# Patient Record
Sex: MALE | Race: WHITE | Employment: FULL TIME | URBAN - METROPOLITAN AREA
[De-identification: names, ages, dates, MRNs, and addresses within clinical notes are randomized per-mention and may not be internally consistent; named-entity substitution may affect disease eponyms.]

---

## 2020-01-29 ENCOUNTER — HOSPITAL ENCOUNTER (OUTPATIENT)
Age: 50
Setting detail: OUTPATIENT SURGERY
Discharge: HOME | End: 2020-01-29
Attending: SPECIALIST | Admitting: SPECIALIST
Payer: OTHER GOVERNMENT

## 2020-01-29 ENCOUNTER — ANESTHESIA (OUTPATIENT)
Dept: ENDOSCOPY | Age: 50
End: 2020-01-29
Payer: OTHER GOVERNMENT

## 2020-01-29 ENCOUNTER — ANESTHESIA EVENT (OUTPATIENT)
Dept: ENDOSCOPY | Age: 50
End: 2020-01-29
Payer: OTHER GOVERNMENT

## 2020-01-29 VITALS
WEIGHT: 196 LBS | HEART RATE: 56 BPM | SYSTOLIC BLOOD PRESSURE: 119 MMHG | RESPIRATION RATE: 16 BRPM | DIASTOLIC BLOOD PRESSURE: 86 MMHG | OXYGEN SATURATION: 95 % | TEMPERATURE: 98.3 F | HEIGHT: 69 IN | BODY MASS INDEX: 29.03 KG/M2

## 2020-01-29 PROCEDURE — 74011250637 HC RX REV CODE- 250/637: Performed by: SPECIALIST

## 2020-01-29 PROCEDURE — 74011000250 HC RX REV CODE- 250: Performed by: NURSE ANESTHETIST, CERTIFIED REGISTERED

## 2020-01-29 PROCEDURE — 74011250636 HC RX REV CODE- 250/636: Performed by: NURSE ANESTHETIST, CERTIFIED REGISTERED

## 2020-01-29 PROCEDURE — 88305 TISSUE EXAM BY PATHOLOGIST: CPT

## 2020-01-29 PROCEDURE — 77030021593 HC FCPS BIOP ENDOSC BSC -A: Performed by: SPECIALIST

## 2020-01-29 PROCEDURE — 76060000031 HC ANESTHESIA FIRST 0.5 HR: Performed by: SPECIALIST

## 2020-01-29 PROCEDURE — 76040000019: Performed by: SPECIALIST

## 2020-01-29 RX ORDER — ATROPINE SULFATE 0.1 MG/ML
0.5 INJECTION INTRAVENOUS
Status: DISCONTINUED | OUTPATIENT
Start: 2020-01-29 | End: 2020-01-29 | Stop reason: HOSPADM

## 2020-01-29 RX ORDER — NALOXONE HYDROCHLORIDE 0.4 MG/ML
0.4 INJECTION, SOLUTION INTRAMUSCULAR; INTRAVENOUS; SUBCUTANEOUS
Status: DISCONTINUED | OUTPATIENT
Start: 2020-01-29 | End: 2020-01-29 | Stop reason: HOSPADM

## 2020-01-29 RX ORDER — LIDOCAINE HYDROCHLORIDE 20 MG/ML
INJECTION, SOLUTION EPIDURAL; INFILTRATION; INTRACAUDAL; PERINEURAL AS NEEDED
Status: DISCONTINUED | OUTPATIENT
Start: 2020-01-29 | End: 2020-01-29 | Stop reason: HOSPADM

## 2020-01-29 RX ORDER — FLUMAZENIL 0.1 MG/ML
0.2 INJECTION INTRAVENOUS
Status: DISCONTINUED | OUTPATIENT
Start: 2020-01-29 | End: 2020-01-29 | Stop reason: HOSPADM

## 2020-01-29 RX ORDER — SODIUM CHLORIDE 9 MG/ML
INJECTION, SOLUTION INTRAVENOUS
Status: DISCONTINUED | OUTPATIENT
Start: 2020-01-29 | End: 2020-01-29 | Stop reason: HOSPADM

## 2020-01-29 RX ORDER — SODIUM CHLORIDE 9 MG/ML
50 INJECTION, SOLUTION INTRAVENOUS CONTINUOUS
Status: DISCONTINUED | OUTPATIENT
Start: 2020-01-29 | End: 2020-01-29 | Stop reason: HOSPADM

## 2020-01-29 RX ORDER — SODIUM CHLORIDE 0.9 % (FLUSH) 0.9 %
5-40 SYRINGE (ML) INJECTION AS NEEDED
Status: DISCONTINUED | OUTPATIENT
Start: 2020-01-29 | End: 2020-01-29 | Stop reason: HOSPADM

## 2020-01-29 RX ORDER — SODIUM CHLORIDE 0.9 % (FLUSH) 0.9 %
5-40 SYRINGE (ML) INJECTION EVERY 8 HOURS
Status: DISCONTINUED | OUTPATIENT
Start: 2020-01-29 | End: 2020-01-29 | Stop reason: HOSPADM

## 2020-01-29 RX ORDER — DEXTROMETHORPHAN/PSEUDOEPHED 2.5-7.5/.8
1.2 DROPS ORAL
Status: DISCONTINUED | OUTPATIENT
Start: 2020-01-29 | End: 2020-01-29 | Stop reason: HOSPADM

## 2020-01-29 RX ORDER — EPINEPHRINE 0.1 MG/ML
1 INJECTION INTRACARDIAC; INTRAVENOUS
Status: DISCONTINUED | OUTPATIENT
Start: 2020-01-29 | End: 2020-01-29 | Stop reason: HOSPADM

## 2020-01-29 RX ORDER — PROPOFOL 10 MG/ML
INJECTION, EMULSION INTRAVENOUS AS NEEDED
Status: DISCONTINUED | OUTPATIENT
Start: 2020-01-29 | End: 2020-01-29 | Stop reason: HOSPADM

## 2020-01-29 RX ORDER — FENTANYL CITRATE 50 UG/ML
12.5-5 INJECTION, SOLUTION INTRAMUSCULAR; INTRAVENOUS
Status: DISCONTINUED | OUTPATIENT
Start: 2020-01-29 | End: 2020-01-29 | Stop reason: HOSPADM

## 2020-01-29 RX ORDER — MIDAZOLAM HYDROCHLORIDE 1 MG/ML
.25-5 INJECTION, SOLUTION INTRAMUSCULAR; INTRAVENOUS
Status: DISCONTINUED | OUTPATIENT
Start: 2020-01-29 | End: 2020-01-29 | Stop reason: HOSPADM

## 2020-01-29 RX ADMIN — PROPOFOL 30 MG: 10 INJECTION, EMULSION INTRAVENOUS at 09:48

## 2020-01-29 RX ADMIN — PROPOFOL 50 MG: 10 INJECTION, EMULSION INTRAVENOUS at 09:42

## 2020-01-29 RX ADMIN — SODIUM CHLORIDE: 900 INJECTION, SOLUTION INTRAVENOUS at 09:30

## 2020-01-29 RX ADMIN — PROPOFOL 30 MG: 10 INJECTION, EMULSION INTRAVENOUS at 09:43

## 2020-01-29 RX ADMIN — PROPOFOL 40 MG: 10 INJECTION, EMULSION INTRAVENOUS at 09:52

## 2020-01-29 RX ADMIN — LIDOCAINE HYDROCHLORIDE 50 MG: 20 INJECTION, SOLUTION EPIDURAL; INFILTRATION; INTRACAUDAL; PERINEURAL at 09:41

## 2020-01-29 RX ADMIN — PROPOFOL 30 MG: 10 INJECTION, EMULSION INTRAVENOUS at 09:45

## 2020-01-29 RX ADMIN — PROPOFOL 100 MG: 10 INJECTION, EMULSION INTRAVENOUS at 09:41

## 2020-01-29 RX ADMIN — PROPOFOL 30 MG: 10 INJECTION, EMULSION INTRAVENOUS at 09:50

## 2020-01-29 RX ADMIN — PROPOFOL 20 MG: 10 INJECTION, EMULSION INTRAVENOUS at 09:44

## 2020-01-29 NOTE — PERIOP NOTES

## 2020-01-29 NOTE — PROCEDURES
Colonoscopy Procedure Note    Indications:   Family history of coloretal cancer (screening only)  Referring Physician: Selene Blackburn MD   Anesthesia/Sedation:MAC  Endoscopist:  Dr. Aaron Vela  Assistant:  Endoscopy Technician-1: Vane Biggs  Endoscopy RN-1: Oleg Pinto RN  Surgical Assistant: None  Implants: None    Preoperative diagnosis: FAMILY HISTORY COLON CANCER    Postoperative diagnosis: Colon polyp      Procedure in Detail:  Informed consent was obtained for the procedure, including sedation. Risks of perforation, hemorrhage, adverse drug reaction, and aspiration were discussed. The patient was placed in the left lateral decubitus position. Based on the pre-procedure assessment, including review of the patient's medical history, medications, allergies, and review of systems, he had been deemed to be an appropriate candidate for moderate sedation; he was therefore sedated with the medications listed above. The patient was monitored continuously with ECG tracing, pulse oximetry, blood pressure monitoring, and direct observations. A rectal examination was performed. The [id number] was inserted into the rectum and advanced under direct vision to the terminal ileum. The quality of the colonic preparation was excellent. A careful inspection was made as the colonoscope was withdrawn, including a retroflexed view of the rectum; findings and interventions are described below. Findings:   Rectum: normal  Sigmoid: normal  Descending Colon: normal  Transverse Colon: normal  Ascending Colon: 2 mm ascending colon polyp  Cecum: normal  Terminal Ileum: normal    Specimens:     see above    EBL: None    Complications: None; patient tolerated the procedure well. Recommendations:     - Await pathology. - Repeat colonoscopy in 5 years.      - If < 10 years, reason: above average risk patient    Signed By: Emerita Alvarado MD                        January 29, 2020

## 2020-01-29 NOTE — DISCHARGE INSTRUCTIONS
Malgorzata Ca  [id number]  1970    COLON DISCHARGE INSTRUCTIONS  Discomfort:  Redness at IV site- apply warm compress to area; if redness or soreness persist- contact your physician  There may be a slight amount of blood passed from the rectum  Gaseous discomfort- walking, belching will help relieve any discomfort  You may not operate a vehicle for 12 hours  You may not engage in an occupation involving machinery or appliances for rest of today  You may not drink alcoholic beverages for at least 12 hours  Avoid making any critical decisions for at least 24 hour  DIET:   Regular diet. - however -  remember your colon is empty and a heavy meal will produce gas. Avoid these foods:  vegetables, fried / greasy foods, carbonated drinks for today. ACTIVITY:  You may resume your normal daily activities it is recommended that you spend the remainder of the day resting -  avoid any strenuous activity. CALL M.D. ANY SIGN OF:  Increasing pain, nausea, vomiting  Abdominal distension (swelling)  New increased bleeding (oral or rectal)  Fever (chills)  Pain in chest area  Bloody discharge from nose or mouth  Shortness of breath    You may  take any Advil, Aspirin, Ibuprofen, Motrin, Aleve, Goodys, or any similar pain or arthritis products or Tylenol as needed for pain. Follow-up Instructions:   Call Dr. Billy Villafuerte  Results of procedure / biopsy in 10-14days   Office telephone for problems or questions [phone]      - Await pathology. - Repeat colonoscopy in 5 years.      - If < 10 years, reason: above average risk patient  Ascending Colon: 2 mm ascending colon polyp

## 2020-01-29 NOTE — H&P
Pre-endoscopy H and P for Colonoscopy    The patient was seen and examined. Date of last colonoscopy: never, Polyps  No      The airway was assessed and documented. The problem list, past medical history, and medications were reviewed. There is no problem list on file for this patient. Social History     Socioeconomic History    Marital status:      Spouse name: Not on file    Number of children: Not on file    Years of education: Not on file    Highest education level: Not on file   Occupational History    Not on file   Social Needs    Financial resource strain: Not on file    Food insecurity:     Worry: Not on file     Inability: Not on file    Transportation needs:     Medical: Not on file     Non-medical: Not on file   Tobacco Use    Smoking status: Never Smoker    Smokeless tobacco: Never Used   Substance and Sexual Activity    Alcohol use:  Yes     Alcohol/week: 1.0 standard drinks     Types: 1 Cans of beer per week    Drug use: Never    Sexual activity: Not on file   Lifestyle    Physical activity:     Days per week: Not on file     Minutes per session: Not on file    Stress: Not on file   Relationships    Social connections:     Talks on phone: Not on file     Gets together: Not on file     Attends [employer] service: Not on file     Active member of club or organization: Not on file     Attends meetings of clubs or organizations: Not on file     Relationship status: Not on file    Intimate partner violence:     Fear of current or ex partner: Not on file     Emotionally abused: Not on file     Physically abused: Not on file     Forced sexual activity: Not on file   Other Topics Concern    Not on file   Social History Narrative    Not on file     Past Medical History:   Diagnosis Date    Arrhythmia     1st degree AV block     The patient has a family history of na    None         The review of systems is:  negative for shortness of breath or chest pain      The heart, lungs and mental status were satisfactory for the administration of MAC sedation and for the procedure. Mallampati score: See Anesthesia. I discussed with the patient the objectives, risks, consequences and alternatives to the procedure. Plan: Endoscopic procedure with MAC sedation.     Melissa Torres MD  1/29/2020  9:39 AM

## 2020-01-29 NOTE — ANESTHESIA PREPROCEDURE EVALUATION
Relevant Problems   No relevant active problems       Anesthetic History   No history of anesthetic complications            Review of Systems / Medical History  Patient summary reviewed, nursing notes reviewed and pertinent labs reviewed    Pulmonary  Within defined limits                 Neuro/Psych   Within defined limits           Cardiovascular                  Exercise tolerance: >4 METS     GI/Hepatic/Renal                Endo/Other  Within defined limits           Other Findings              Physical Exam    Airway  Mallampati: I  TM Distance: > 6 cm  Neck ROM: normal range of motion   Mouth opening: Normal     Cardiovascular    Rhythm: regular  Rate: normal         Dental  No notable dental hx       Pulmonary  Breath sounds clear to auscultation               Abdominal         Other Findings            Anesthetic Plan    ASA: 1  Anesthesia type: MAC          Induction: Intravenous  Anesthetic plan and risks discussed with: Patient

## 2020-01-30 NOTE — ANESTHESIA POSTPROCEDURE EVALUATION
Post-Anesthesia Evaluation and Assessment    Patient: Sindhu Martinez MRN: [id number]  SSN: [id number]    YOB: 1970  Age: 54 y.o. Sex: male      I have evaluated the patient and they are stable and ready for discharge from the PACU. Cardiovascular Function/Vital Signs  Visit Vitals  /86   Pulse (!) 56   Temp 36.8 °C (98.3 °F)   Resp 16   Ht 5' 9\" (1.753 m)   Wt 88.9 kg (196 lb)   SpO2 95%   BMI 28.94 kg/m²       Patient is status post MAC anesthesia for Procedure(s):  COLONOSCOPY  ENDOSCOPIC POLYPECTOMY. Nausea/Vomiting: None    Postoperative hydration reviewed and adequate. Pain:  Pain Scale 1: Numeric (0 - 10) (01/29/20 1023)  Pain Intensity 1: 0 (01/29/20 1023)   Managed    Neurological Status: At baseline    Mental Status, Level of Consciousness: Alert and  oriented to person, place, and time    Pulmonary Status:   O2 Device: Room air (01/29/20 1023)   Adequate oxygenation and airway patent    Complications related to anesthesia: None    Post-anesthesia assessment completed. No concerns    Signed By: Christi Horner MD     January 30, 2020              Procedure(s):  COLONOSCOPY  ENDOSCOPIC POLYPECTOMY.     MAC    <BSHSIANPOST>    Vitals Value Taken Time   /86 1/29/2020 10:23 AM   Temp 36.8 °C (98.3 °F) 1/29/2020 10:00 AM   Pulse 56 1/29/2020 10:23 AM   Resp 16 1/29/2020 10:23 AM   SpO2 95 % 1/29/2020 10:23 AM

## 2020-08-27 ENCOUNTER — HOSPITAL ENCOUNTER (EMERGENCY)
Age: 50
Discharge: HOME | End: 2020-08-27
Attending: EMERGENCY MEDICINE
Payer: OTHER GOVERNMENT

## 2020-08-27 VITALS
OXYGEN SATURATION: 99 % | HEIGHT: 69 IN | HEART RATE: 71 BPM | RESPIRATION RATE: 16 BRPM | BODY MASS INDEX: 31.77 KG/M2 | TEMPERATURE: 97.9 F | SYSTOLIC BLOOD PRESSURE: 155 MMHG | WEIGHT: 214.51 LBS | DIASTOLIC BLOOD PRESSURE: 76 MMHG

## 2020-08-27 DIAGNOSIS — Z20.3 NEED FOR POST EXPOSURE PROPHYLAXIS FOR RABIES: ICD-10-CM

## 2020-08-27 DIAGNOSIS — Z20.3 RABIES EXPOSURE: Primary | ICD-10-CM

## 2020-08-27 PROCEDURE — 90471 IMMUNIZATION ADMIN: CPT

## 2020-08-27 PROCEDURE — 74011250636 HC RX REV CODE- 250/636: Performed by: PHYSICIAN ASSISTANT

## 2020-08-27 PROCEDURE — 90375 RABIES IG IM/SC: CPT | Performed by: EMERGENCY MEDICINE

## 2020-08-27 PROCEDURE — 96374 THER/PROPH/DIAG INJ IV PUSH: CPT

## 2020-08-27 PROCEDURE — 74011250636 HC RX REV CODE- 250/636: Performed by: EMERGENCY MEDICINE

## 2020-08-27 PROCEDURE — 99282 EMERGENCY DEPT VISIT SF MDM: CPT

## 2020-08-27 PROCEDURE — 90675 RABIES VACCINE IM: CPT | Performed by: PHYSICIAN ASSISTANT

## 2020-08-27 RX ADMIN — RABIES IMMUNE GLOBULIN (HUMAN) 450 UNITS: 300 INJECTION, SOLUTION INFILTRATION; INTRAMUSCULAR at 17:27

## 2020-08-27 RX ADMIN — RABIES VACCINE 2.5 UNITS: KIT at 17:28

## 2020-08-27 RX ADMIN — RABIES IMMUNE GLOBULIN (HUMAN) 1500 UNITS: 300 INJECTION, SOLUTION INFILTRATION; INTRAMUSCULAR at 17:24

## 2020-08-27 NOTE — DISCHARGE INSTRUCTIONS
Follow up with the [facility] as scheduled for additional injections: 8/30/20, 9/3/20, and 9/10/20. Return to the Emergency Dept for any concerns.
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
## 2020-08-27 NOTE — LETTER
[address] 
[facility] EMERGENCY DEPT 
[address] [place] [address] Yee Garza [id number] 
[phone] Work/School Note Date: 8/27/2020 To Whom It May concern: 
 
 
Ramya Boyd was seen and treated today in the emergency room. He will need to return to the Infusion center for additional treatments on 8/30/20, 9/3/20, and 9/10/20. Sincerely, Daxa Chatterjee

## 2020-08-27 NOTE — ED NOTES
Patient discharged by [facility] and this RN. Patient ambulated with steady gait in NAD on departure.

## 2020-08-30 ENCOUNTER — HOSPITAL ENCOUNTER (EMERGENCY)
Age: 50
Discharge: HOME | End: 2020-08-30
Attending: EMERGENCY MEDICINE
Payer: OTHER GOVERNMENT

## 2020-08-30 VITALS
HEIGHT: 69 IN | RESPIRATION RATE: 18 BRPM | TEMPERATURE: 97.4 F | SYSTOLIC BLOOD PRESSURE: 165 MMHG | BODY MASS INDEX: 32 KG/M2 | HEART RATE: 70 BPM | OXYGEN SATURATION: 98 % | DIASTOLIC BLOOD PRESSURE: 84 MMHG | WEIGHT: 216.05 LBS

## 2020-08-30 DIAGNOSIS — Z20.3 NEED FOR POST EXPOSURE PROPHYLAXIS FOR RABIES: Primary | ICD-10-CM

## 2020-08-30 PROCEDURE — 99282 EMERGENCY DEPT VISIT SF MDM: CPT

## 2020-08-30 PROCEDURE — 90471 IMMUNIZATION ADMIN: CPT

## 2020-08-30 PROCEDURE — 74011250636 HC RX REV CODE- 250/636: Performed by: PHYSICIAN ASSISTANT

## 2020-08-30 PROCEDURE — 90675 RABIES VACCINE IM: CPT | Performed by: PHYSICIAN ASSISTANT

## 2020-08-30 RX ADMIN — Medication 2.5 UNITS: at 11:56

## 2020-08-30 NOTE — DISCHARGE INSTRUCTIONS
Continue rabies post exposure prophylaxis series.      Dose #1 was 27 AUG 2020  Dose #2 of #4 was completed TODAY  Dose #3 of #4 is due on 03 SEP 2020  Dose #4 of #4 is due on 10 SEP 2020

## 2020-08-30 NOTE — ED NOTES
KRISTEN Baldwin at bedside to provide discharge paperwork. Vital signs stable. Pt in no apparent distress at this time. Mental status at baseline. Ambulatory to waiting room with steady gate, discharge paperwork in hand. Patient and or family acknowledged and signed discharge instructions that were created/provided by the Physician. Signed discharge form with patient label placed in scan box.
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
## 2020-09-03 ENCOUNTER — HOSPITAL ENCOUNTER (OUTPATIENT)
Dept: INFUSION THERAPY | Age: 50
Discharge: HOME | End: 2020-09-03
Payer: OTHER GOVERNMENT

## 2020-09-03 VITALS
TEMPERATURE: 97.3 F | HEART RATE: 54 BPM | OXYGEN SATURATION: 100 % | DIASTOLIC BLOOD PRESSURE: 84 MMHG | RESPIRATION RATE: 18 BRPM | SYSTOLIC BLOOD PRESSURE: 124 MMHG

## 2020-09-03 PROCEDURE — 96372 THER/PROPH/DIAG INJ SC/IM: CPT

## 2020-09-03 PROCEDURE — 90471 IMMUNIZATION ADMIN: CPT

## 2020-09-03 PROCEDURE — 74011250636 HC RX REV CODE- 250/636: Performed by: PHYSICIAN ASSISTANT

## 2020-09-03 PROCEDURE — 90675 RABIES VACCINE IM: CPT | Performed by: PHYSICIAN ASSISTANT

## 2020-09-03 RX ADMIN — RABIES VACCINE 2.5 UNITS: KIT at 10:01

## 2020-09-03 NOTE — PROGRESS NOTES
Outpatient Infusion Center Short Visit Progress Note    7170 Patient admitted to [facility] Day 7 ambulatory in stable condition. Assessment completed. No new concerns voiced. Vital Signs:  Visit Vitals  /84   Pulse (!) 54   Temp 97.3 °F (36.3 °C)   Resp 18   SpO2 100%       Medications:  Medications Administered     rabies vaccine, PCEC (RABAVERT) kit 2.5 Units     Admin Date  09/03/2020 Action  Given Dose  2.5 Units Route  IntraMUSCular Administered By  Sydnee Cueto A              Bandage placed over site. Patient tolerated treatment well. Patient discharged from [facility] 58 ambulatory in no distress at 1005. Patient aware of next appointment.     Future Appointments   Date Time Provider Calderon Beasley   9/10/2020  2:00 PM [facility] - [facility] INFUSION NURSE 1 [address] [facility]

## 2020-09-10 ENCOUNTER — HOSPITAL ENCOUNTER (OUTPATIENT)
Dept: INFUSION THERAPY | Age: 50
Discharge: HOME | End: 2020-09-10
Payer: OTHER GOVERNMENT

## 2020-09-10 VITALS
SYSTOLIC BLOOD PRESSURE: 137 MMHG | HEART RATE: 68 BPM | RESPIRATION RATE: 16 BRPM | DIASTOLIC BLOOD PRESSURE: 88 MMHG | OXYGEN SATURATION: 97 % | TEMPERATURE: 98.2 F

## 2020-09-10 PROCEDURE — 90471 IMMUNIZATION ADMIN: CPT

## 2020-09-10 PROCEDURE — 96372 THER/PROPH/DIAG INJ SC/IM: CPT

## 2020-09-10 PROCEDURE — 74011250636 HC RX REV CODE- 250/636: Performed by: PHYSICIAN ASSISTANT

## 2020-09-10 PROCEDURE — 90675 RABIES VACCINE IM: CPT | Performed by: PHYSICIAN ASSISTANT

## 2020-09-10 RX ADMIN — RABIES VACCINE 2.5 UNITS: KIT at 14:17

## 2020-09-10 NOTE — PROGRESS NOTES
Outpatient Infusion Center Short Visit Progress Note    1400 Patient admitted to [facility] for Rabies Injection ambulatory in stable condition. Assessment completed. No new concerns voiced. Vital Signs:  Visit Vitals  /88 (BP 1 Location: Right arm, BP Patient Position: Sitting)   Pulse 68   Temp 98.2 °F (36.8 °C)   Resp 16   SpO2 97%       Medications:  Medications Administered     rabies vaccine, PCEC (RABAVERT) kit 2.5 Units     Admin Date  09/10/2020 Action  Given Dose  2.5 Units Route  IntraMUSCular Administered By  Nalini REIS                Patient tolerated treatment well. Patient discharged from [facility] 58 ambulatory in no distress at 1430. Patient aware of next appointment. No future appointments.

## 2021-04-07 ENCOUNTER — TRANSCRIBE ORDER (OUTPATIENT)
Dept: SCHEDULING | Age: 51
End: 2021-04-07

## 2021-04-07 DIAGNOSIS — M67.911 TENDINOPATHY OF RIGHT ROTATOR CUFF: ICD-10-CM

## 2021-04-07 DIAGNOSIS — M75.41 ROTATOR CUFF IMPINGEMENT SYNDROME OF RIGHT SHOULDER: Primary | ICD-10-CM

## 2021-04-07 DIAGNOSIS — M25.511 ACUTE PAIN OF RIGHT SHOULDER: ICD-10-CM

## 2021-04-15 ENCOUNTER — HOSPITAL ENCOUNTER (OUTPATIENT)
Dept: MRI IMAGING | Age: 51
Discharge: HOME | End: 2021-04-15
Attending: FAMILY MEDICINE
Payer: OTHER GOVERNMENT

## 2021-04-15 DIAGNOSIS — M25.511 ACUTE PAIN OF RIGHT SHOULDER: ICD-10-CM

## 2021-04-15 DIAGNOSIS — M75.41 ROTATOR CUFF IMPINGEMENT SYNDROME OF RIGHT SHOULDER: ICD-10-CM

## 2021-04-15 DIAGNOSIS — M67.911 TENDINOPATHY OF RIGHT ROTATOR CUFF: ICD-10-CM

## 2021-04-15 PROCEDURE — 73221 MRI JOINT UPR EXTREM W/O DYE: CPT

## 2021-08-24 ENCOUNTER — TRANSCRIBE ORDER (OUTPATIENT)
Dept: SCHEDULING | Age: 51
End: 2021-08-24

## 2021-08-24 DIAGNOSIS — M51.36 DDD (DEGENERATIVE DISC DISEASE), LUMBAR: Primary | ICD-10-CM

## 2021-09-13 ENCOUNTER — HOSPITAL ENCOUNTER (OUTPATIENT)
Dept: MRI IMAGING | Age: 51
Discharge: HOME | End: 2021-09-13
Attending: FAMILY MEDICINE
Payer: OTHER GOVERNMENT

## 2021-09-13 DIAGNOSIS — M51.36 DDD (DEGENERATIVE DISC DISEASE), LUMBAR: ICD-10-CM

## 2021-09-13 PROCEDURE — 72148 MRI LUMBAR SPINE W/O DYE: CPT

## 2023-11-06 NOTE — PROGRESS NOTES
Dao Reef  1970  [id number]    Situation:  Verbal report received from: Ivett Milliagn  Procedure: Procedure(s):  COLONOSCOPY  ENDOSCOPIC POLYPECTOMY    Background:    Preoperative diagnosis: FAMILY HISTORY COLON CANCER  Postoperative diagnosis: Colon polyp    :  Dr. Nhung Roberts  Assistant(s): Endoscopy Technician-1: Kevin Judd  Endoscopy RN-1: Lissa Vazquez RN    Specimens:   ID Type Source Tests Collected by Time Destination   1 : polyp Preservative Colon, Ascending  Alexandro Elias MD 1/29/2020 2157 Pathology     H. Pylori  no    Assessment:  Intra-procedure medications       Anesthesia gave intra-procedure sedation and medications, see anesthesia flow sheet yes    Intravenous fluids: NS@ KVO     Vital signs stable  yes    Abdominal assessment: round and soft  yes    Recommendation:  Discharge patient per MD order yes.   Family or Friend  yes  Permission to share finding with family or friend yes normal...

## 2024-04-02 ENCOUNTER — HOSPITAL ENCOUNTER (OUTPATIENT)
Facility: HOSPITAL | Age: 54
Discharge: HOME | End: 2024-04-05
Payer: COMMERCIAL

## 2024-04-02 ENCOUNTER — TRANSCRIBE ORDERS (OUTPATIENT)
Facility: HOSPITAL | Age: 54
End: 2024-04-02

## 2024-04-02 DIAGNOSIS — R06.02 SHORTNESS OF BREATH: ICD-10-CM

## 2024-04-02 DIAGNOSIS — R06.02 SHORTNESS OF BREATH: Primary | ICD-10-CM

## 2024-04-02 PROCEDURE — 71046 X-RAY EXAM CHEST 2 VIEWS: CPT

## 2025-02-26 ENCOUNTER — HOSPITAL ENCOUNTER (OUTPATIENT)
Facility: HOSPITAL | Age: 55
Setting detail: OUTPATIENT SURGERY
Discharge: HOME | End: 2025-02-26
Attending: INTERNAL MEDICINE | Admitting: INTERNAL MEDICINE
Payer: COMMERCIAL

## 2025-02-26 ENCOUNTER — ANESTHESIA EVENT (OUTPATIENT)
Facility: HOSPITAL | Age: 55
End: 2025-02-26
Payer: COMMERCIAL

## 2025-02-26 ENCOUNTER — ANESTHESIA (OUTPATIENT)
Facility: HOSPITAL | Age: 55
End: 2025-02-26
Payer: COMMERCIAL

## 2025-02-26 VITALS
WEIGHT: 214 LBS | HEART RATE: 66 BPM | DIASTOLIC BLOOD PRESSURE: 56 MMHG | TEMPERATURE: 97.8 F | BODY MASS INDEX: 31.7 KG/M2 | OXYGEN SATURATION: 97 % | SYSTOLIC BLOOD PRESSURE: 103 MMHG | HEIGHT: 69 IN | RESPIRATION RATE: 17 BRPM

## 2025-02-26 PROCEDURE — 6360000002 HC RX W HCPCS: Performed by: NURSE ANESTHETIST, CERTIFIED REGISTERED

## 2025-02-26 PROCEDURE — 7100000011 HC PHASE II RECOVERY - ADDTL 15 MIN: Performed by: INTERNAL MEDICINE

## 2025-02-26 PROCEDURE — 3700000001 HC ADD 15 MINUTES (ANESTHESIA): Performed by: INTERNAL MEDICINE

## 2025-02-26 PROCEDURE — 2709999900 HC NON-CHARGEABLE SUPPLY: Performed by: INTERNAL MEDICINE

## 2025-02-26 PROCEDURE — 88305 TISSUE EXAM BY PATHOLOGIST: CPT

## 2025-02-26 PROCEDURE — 2720000010 HC SURG SUPPLY STERILE: Performed by: INTERNAL MEDICINE

## 2025-02-26 PROCEDURE — 3600007502: Performed by: INTERNAL MEDICINE

## 2025-02-26 PROCEDURE — 3600007512: Performed by: INTERNAL MEDICINE

## 2025-02-26 PROCEDURE — 3700000000 HC ANESTHESIA ATTENDED CARE: Performed by: INTERNAL MEDICINE

## 2025-02-26 PROCEDURE — 2580000003 HC RX 258: Performed by: NURSE ANESTHETIST, CERTIFIED REGISTERED

## 2025-02-26 PROCEDURE — 7100000010 HC PHASE II RECOVERY - FIRST 15 MIN: Performed by: INTERNAL MEDICINE

## 2025-02-26 RX ORDER — HYDROCHLOROTHIAZIDE 12.5 MG/1
12.5 TABLET ORAL
COMMUNITY
Start: 2024-06-08

## 2025-02-26 RX ORDER — SODIUM CHLORIDE 9 MG/ML
INJECTION, SOLUTION INTRAVENOUS
Status: DISCONTINUED | OUTPATIENT
Start: 2025-02-26 | End: 2025-02-26 | Stop reason: SDUPTHER

## 2025-02-26 RX ORDER — ATORVASTATIN CALCIUM 10 MG/1
10 TABLET, FILM COATED ORAL
COMMUNITY
Start: 2024-06-08

## 2025-02-26 RX ADMIN — PROPOFOL 100 MG: 10 INJECTION, EMULSION INTRAVENOUS at 14:19

## 2025-02-26 RX ADMIN — PROPOFOL 100 MG: 10 INJECTION, EMULSION INTRAVENOUS at 14:09

## 2025-02-26 RX ADMIN — PROPOFOL 100 MG: 10 INJECTION, EMULSION INTRAVENOUS at 14:13

## 2025-02-26 RX ADMIN — LIDOCAINE HYDROCHLORIDE 80 MG: 20 INJECTION, SOLUTION EPIDURAL; INFILTRATION; INTRACAUDAL; PERINEURAL at 14:09

## 2025-02-26 RX ADMIN — SODIUM CHLORIDE: 9 INJECTION, SOLUTION INTRAVENOUS at 14:07

## 2025-02-26 ASSESSMENT — PAIN - FUNCTIONAL ASSESSMENT: PAIN_FUNCTIONAL_ASSESSMENT: 0-10

## 2025-02-26 NOTE — PERIOP NOTE
ARRIVAL INFORMATION:  Verified patient name and date of birth, scheduled procedure, and informed consent.     : Angélica cramer contact number: [phone]  Physician and staff can share information with the .     Receive texts: yes    Belongings with patient include:  Clothing,None    GI FOCUSED ASSESSMENT:  Neuro: Awake, alert, oriented x4  Respiratory: even and unlabored   GI: soft and non-distended  EKG Rhythm: normal sinus rhythm, PVC    Education:Reviewed general discharge instructions and  information.

## 2025-02-26 NOTE — DISCHARGE INSTRUCTIONS
Wilfredo Evansy  [id number]  1970    It was my pleasure seeing you for your procedure.  You will also receive a summary report with the findings from this procedure and any further recommendations.  If you had polyps removed or biopsies taken during your procedure, you will receive a separate letter from me within the next 2 weeks.  If you don't receive this letter or if you have any questions, please call my office [phone].     Please take note of the post procedure instructions listed below.    Best Wishes,    Dr. Jiménez      CARE FOLLOWING YOUR PROCEDURE    These instructions give you information on caring for yourself after your procedure. Call your doctor if you have any problems or questions after your procedure.    HOME CARE  Walk if you have belly cramping or gas.  Walking will help get rid of the air and reduce the bloated feeling in your belly (abdomen).  Your IV site (where you received drugs) may be tender to touch.  Place warm towels on the site; keep your arm up on two pillows if you have any swelling or soreness in the area.  You may shower.    ACTIVITY:  Take frequent rest periods and move at a slower pace for the next 24 hours..  You may resume your regular activity tomorrow if you are feeling back to normal.  Do not drive or ride a bicycle for at least 24 hours (because of the medicine (anesthesia) used during the test).  Do not sign any important legal documents or use or operate any machinery for 24 hours  Do not take sleeping medicines/nerve drugs for 24 hours unless the doctor tells you.  You can return to work/school tomorrow unless otherwise instructed.    NUTRITION:  Drink plenty of fluids to keep your pee (urine) clear or pale yellow  Begin with a light meal and progress to your normal diet. Heavy or fried foods are harder to digest and may make you feel sick to your stomach (nauseated).  Once you are feeling back to normal, you may resume your normal diet

## 2025-02-26 NOTE — ANESTHESIA PRE PROCEDURE
Department of Anesthesiology  Preprocedure Note       Name:  Wilfredo Davis   Age:  54 y.o.  :  1970                                          MRN:  [id number]         Date:  2025      Surgeon: Surgeon(s):  Rafa Jiménez MD    Procedure: Procedure(s):  COLONOSCOPY DIAGNOSTIC    Medications prior to admission:   Prior to Admission medications    Not on File       Current medications:    No current facility-administered medications for this encounter.       Allergies:  Not on File    Problem List:  There is no problem list on file for this patient.      Past Medical History:        Diagnosis Date    Arrhythmia     1st degree AV block       Past Surgical History:        Procedure Laterality Date    COLONOSCOPY N/A 2020    COLONOSCOPY performed by Shaheen Escobar MD at [facility] ENDOSCOPY       Social History:    Social History     Tobacco Use    Smoking status: Never    Smokeless tobacco: Never   Substance Use Topics    Alcohol use: Yes     Alcohol/week: 1.0 standard drink of alcohol                                Counseling given: Not Answered      Vital Signs (Current): There were no vitals filed for this visit.                                           BP Readings from Last 3 Encounters:   No data found for BP       NPO Status:                                                                                 BMI:   Wt Readings from Last 3 Encounters:   No data found for Wt     There is no height or weight on file to calculate BMI.    CBC: No results found for: \"WBC\", \"RBC\", \"HGB\", \"HCT\", \"MCV\", \"RDW\", \"PLT\"    CMP: No results found for: \"NA\", \"K\", \"CL\", \"CO2\", \"BUN\", \"CREATININE\", \"GFRAA\", \"AGRATIO\", \"LABGLOM\", \"GLUCOSE\", \"GLU\", \"CALCIUM\", \"BILITOT\", \"ALKPHOS\", \"AST\", \"ALT\"    POC Tests: No results for input(s): \"POCGLU\", \"POCNA\", \"POCK\", \"POCCL\", \"POCBUN\", \"POCHEMO\", \"POCHCT\" in the last 72 hours.    Coags: No results found for: \"PROTIME\", \"INR\", \"APTT\"    HCG (If Applicable): No results found

## 2025-02-26 NOTE — PERIOP NOTE
Endoscopy Case End Note:    1428:  Procedure scope was pre-cleaned, per protocol, at bedside by Keith BRADSHAW      1428:  Report received from anesthesia - Nipper CRNA .  See anesthesia flowsheet for intra-procedure vital signs and events.

## 2025-02-26 NOTE — ANESTHESIA POSTPROCEDURE EVALUATION
Department of Anesthesiology  Postprocedure Note    Patient: Wilfredo Davis  MRN: [id number]  YOB: 1970  Date of evaluation: 2/26/2025    Procedure Summary       Date: 02/26/25 Room / Location: hospitals ENDO 02 / [facility] ENDOSCOPY    Anesthesia Start: 1407 Anesthesia Stop: 1430    Procedures:       COLORECTAL CANCER SCREENING, HIGH RISK (Lower GI Region)      COLONOSCOPY POLYPECTOMY SNARE/BIOPSY Diagnosis:       Encounter for screening colonoscopy      Family history of malignant neoplasm of gastrointestinal tract      Polyp of descending colon    Surgeons: Rafa Jiménez MD Responsible Provider: Wilbur Bojorquez MD    Anesthesia Type: MAC, TIVA ASA Status: 2            Anesthesia Type: MAC, TIVA    Haile Phase I: Haile Score: 10    Haile Phase II: Haile Score: 10    Anesthesia Post Evaluation    Patient location during evaluation: PACU  Patient participation: complete - patient participated  Level of consciousness: awake  Airway patency: patent  Nausea & Vomiting: no vomiting  Cardiovascular status: hemodynamically stable  Respiratory status: acceptable  Hydration status: euvolemic    No notable events documented.

## 2025-02-26 NOTE — OP NOTE
Colonoscopy Procedure Note      Indications:  high risk screening fhx/o colon cancer     :  Rafa Jiménez MD    Staff: Circulator: Arely Wang RN  Endoscopy Technician: Keith Madsen    Referring Provider: Fabricio Kay MD    Sedation:  MAC anesthesia Propofol    Procedure Details:  After informed consent was obtained with all risks and benefits of procedure explained and preoperative exam completed, the patient was taken to the endoscopy suite and placed in the left lateral decubitus position.  Upon sequential sedation as per above, a digital rectal exam was performed per below.  The Olympus videocolonoscope was inserted in the rectum and carefully advanced to the cecum, which was identified by the ileocecal valve and appendiceal orifice.  The quality of preparation was  excellent BPS 9 .  The colonoscope was slowly withdrawn with careful evaluation between folds. Retroflexion in the rectum was performed.     Findings:   JOCE: unremarkable  Rectum: normal  no mucosal lesion appreciated  Sigmoid: normal  no mucosal lesion appreciated  Descending Colon: one 3mm sessile polyp removed with cold snare polypectomy, retrieved, minimal bleeding  Transverse Colon: normal  no mucosal lesion appreciated  Ascending Colon: normal  no mucosal lesion appreciated  Cecum: normal  no mucosal lesion appreciated  Terminal Ileum: not intubated    Complications: None.     EBL:  minimal    Impression:    See Findings above    Recommendations:   - Await pathology. You should receive a letter within 2 weeks.   - Resume normal medications.  - Recommend repeat colonoscopy pending path  - left  on Angélica's personalized  box [phone]    Discharge Disposition:  Home in the company of a  when able to ambulate.    Rafa Jiménez MD  2/26/2025  2:29 PM

## 2025-02-26 NOTE — H&P
[facility]  Outpatient History and Physical    Patient: Wilfredo Davis    Physician: Rafa Jiménez MD    Vital Signs: Blood pressure (!) 165/83, pulse 67, temperature 98.1 °F (36.7 °C), temperature source Temporal, resp. rate 20, height 1.753 m (5' 9\"), weight 97.1 kg (214 lb), SpO2 100%.    Allergies:   No Known Allergies    Chief Complaint: fhx/o colon cancer 2 paternal uncles    History:  Past Medical History:   Diagnosis Date    Arrhythmia     1st degree AV block    Hyperlipidemia     Sleep apnea       Past Surgical History:   Procedure Laterality Date    COLONOSCOPY N/A 1/29/2020    COLONOSCOPY performed by Shaheen Escobar MD at [facility] ENDOSCOPY      Social History     Socioeconomic History    Marital status:      Spouse name: None    Number of children: None    Years of education: None    Highest education level: None   Tobacco Use    Smoking status: Never    Smokeless tobacco: Never   Substance and Sexual Activity    Alcohol use: Yes     Alcohol/week: 1.0 standard drink of alcohol     Comment: occassionally    Drug use: Never      History reviewed. No pertinent family history.    Medications:   Prior to Admission medications    Medication Sig Start Date End Date Taking? Authorizing Provider   atorvastatin (LIPITOR) 10 MG tablet Take 1 tablet by mouth 6/8/24  Yes Luis Burns MD   hydroCHLOROthiazide 12.5 MG tablet Take 1 tablet by mouth 6/8/24  Yes ProviderLuis MD   Tirzepatide-Weight Management (ZEPBOUND SC) Inject into the skin   Yes ProviderLuis MD       Physical Exam:   General: no distress   HEENT: Head: Normocephalic, no lesions, without obvious abnormality.   Heart: rate normal, no leg edema    Lungs: symmetric chest rise, no work of breathing   Abdominal:  soft, NT, ND   Neurological: Grossly normal   Extremities: extremities normal, atraumatic, no cyanosis or edema     Plan of Care/Planned Procedure: colonoscopy  The heart,

## (undated) DEVICE — TRAP ENDOSCP POLYP 2 CHMBR DRAWER TYP

## (undated) DEVICE — ENDOSCOPIC KIT COMPLIANCE ENDOKIT

## (undated) DEVICE — CUFF BLD PRSS AD CLTH SGL TB W/ BAYNT CONN ROUNDED CORNER

## (undated) DEVICE — TIP SUCT TRNSPAR RIB SURF STD BLB RIG NVENT W/ 5IN1 CONN DYND50138] MEDLINE INDUSTRIES INC]

## (undated) DEVICE — FORCEPS BX L240CM JAW DIA2.8MM L CAP W/ NDL MIC MESH TOOTH

## (undated) DEVICE — SNARE ENDOSCP AD L240CM LOOP W10MM SHTH DIA2.4MM RND INSUL

## (undated) DEVICE — FIAPC® PROBE W/ FILTER 2200 C OD 2.3MM/6.9FR; L 2.2M/7.2FT: Brand: ERBE

## (undated) DEVICE — ELECTRODE PT RET AD L9FT HI MOIST COND ADH HYDRGEL CORDED

## (undated) DEVICE — FORCEPS BX L240CM JAW DIA2.4MM ORNG L CAP W/ NDL DISP RAD

## (undated) DEVICE — IV START KIT: Brand: MEDLINE

## (undated) DEVICE — INJECTION THERAPY NEEDLE CATHETER: Brand: INTERJECT

## (undated) DEVICE — CONTAINER SPEC 20 ML LID NEUT BUFF FORMALIN 10 % POLYPR STS

## (undated) DEVICE — FIAPC® PROBE W/ FILTER 2200 A OD 2.3MM/6.9FR; L 2.2M/7.2FT: Brand: ERBE